# Patient Record
Sex: MALE | Race: WHITE | HISPANIC OR LATINO | Employment: FULL TIME | URBAN - METROPOLITAN AREA
[De-identification: names, ages, dates, MRNs, and addresses within clinical notes are randomized per-mention and may not be internally consistent; named-entity substitution may affect disease eponyms.]

---

## 2020-10-27 ENCOUNTER — OFFICE VISIT (OUTPATIENT)
Dept: URGENT CARE | Facility: CLINIC | Age: 20
End: 2020-10-27
Payer: OTHER MISCELLANEOUS

## 2020-10-27 VITALS
SYSTOLIC BLOOD PRESSURE: 100 MMHG | BODY MASS INDEX: 30.65 KG/M2 | RESPIRATION RATE: 14 BRPM | OXYGEN SATURATION: 99 % | HEIGHT: 68 IN | DIASTOLIC BLOOD PRESSURE: 61 MMHG | TEMPERATURE: 97.4 F | WEIGHT: 202.2 LBS | HEART RATE: 74 BPM

## 2020-10-27 DIAGNOSIS — K29.00 ACUTE GASTRITIS, PRESENCE OF BLEEDING UNSPECIFIED, UNSPECIFIED GASTRITIS TYPE: ICD-10-CM

## 2020-10-27 PROCEDURE — 99204 OFFICE O/P NEW MOD 45 MIN: CPT | Performed by: PHYSICIAN ASSISTANT

## 2020-10-27 RX ORDER — SUCRALFATE 1 G/1
1 TABLET ORAL
Qty: 120 TAB | Refills: 0 | Status: SHIPPED | OUTPATIENT
Start: 2020-10-27

## 2020-10-27 SDOH — HEALTH STABILITY: MENTAL HEALTH: HOW OFTEN DO YOU HAVE 6 OR MORE DRINKS ON ONE OCCASION?: MONTHLY

## 2020-10-27 ASSESSMENT — ENCOUNTER SYMPTOMS
NAUSEA: 0
FLANK PAIN: 0
FEVER: 0
CHILLS: 0
CONSTIPATION: 0
VOMITING: 0
HEARTBURN: 0
BLOOD IN STOOL: 0
ABDOMINAL PAIN: 1
ROS GI COMMENTS: BLOATING
DIARRHEA: 1

## 2020-10-27 NOTE — PROGRESS NOTES
Subjective:   Nolan Richter is a 20 y.o. male who presents today with   Chief Complaint   Patient presents with   • Abdominal Pain     x 1 week more to the L side. Denies blood in stool or urine but positive for diarrhea x 2 days.  He is worried about an ulcer.       Abdominal Pain  This is a new problem. The current episode started in the past 7 days. The onset quality is gradual. The problem occurs constantly. The problem has been unchanged. The pain is located in the generalized abdominal region. The pain is mild. The quality of the pain is sharp and cramping. Associated symptoms include diarrhea. Pertinent negatives include no constipation, dysuria, fever, frequency, hematuria, melena, nausea or vomiting. Nothing aggravates the pain. The pain is relieved by nothing. He has tried nothing for the symptoms. The treatment provided no relief.     States he does drink coffee Monday through Friday.  He states that he had similar issue for about a week 2 months ago.  He states that nothing makes the symptoms better.  He states that he does have some stress at work but nothing more than usual.  Diarrhea is occurring about 5-10 times a day.  PMH:  has no past medical history on file.  MEDS:   Current Outpatient Medications:   •  sucralfate (CARAFATE) 1 GM Tab, Take 1 Tab by mouth 4 Times a Day,Before Meals and at Bedtime., Disp: 120 Tab, Rfl: 0  ALLERGIES:   Allergies   Allergen Reactions   • Azithromycin Nausea     SURGHX: No past surgical history on file.  SOCHX:  reports that he has never smoked. He has never used smokeless tobacco. He reports current alcohol use of about 1.8 oz of alcohol per week. He reports previous drug use.  FH: Reviewed with patient, not pertinent to this visit.       Review of Systems   Constitutional: Negative for chills and fever.   Gastrointestinal: Positive for abdominal pain and diarrhea. Negative for blood in stool, constipation, heartburn, melena, nausea and vomiting.        Bloating  "  Genitourinary: Negative for dysuria, flank pain, frequency, hematuria and urgency.   All other systems reviewed and are negative.       Objective:   /61   Pulse 74   Temp 36.3 °C (97.4 °F) (Temporal)   Resp 14   Ht 1.727 m (5' 8\")   Wt 91.7 kg (202 lb 3.2 oz)   SpO2 99%   BMI 30.74 kg/m²   Physical Exam  Vitals signs and nursing note reviewed.   Constitutional:       General: He is not in acute distress.     Appearance: Normal appearance. He is well-developed. He is not ill-appearing or toxic-appearing.   HENT:      Head: Normocephalic and atraumatic.      Right Ear: Hearing normal.      Left Ear: Hearing normal.   Eyes:      Pupils: Pupils are equal, round, and reactive to light.   Cardiovascular:      Rate and Rhythm: Normal rate and regular rhythm.      Heart sounds: Normal heart sounds.   Pulmonary:      Effort: Pulmonary effort is normal.   Abdominal:      General: Abdomen is flat. Bowel sounds are increased. There is no distension.      Palpations: Abdomen is soft. There is no shifting dullness.      Tenderness: There is generalized abdominal tenderness. There is guarding. There is no right CVA tenderness, left CVA tenderness or rebound. Negative signs include Flores's sign and McBurney's sign.      Hernia: No hernia is present.   Musculoskeletal:      Comments: Normal movement in all 4 extremities   Skin:     General: Skin is warm and dry.   Neurological:      Mental Status: He is alert.      Coordination: Coordination normal.   Psychiatric:         Mood and Affect: Mood normal.       GI Cocktail did help resolve his symptoms today slightly.    Assessment/Plan:   Assessment    1. Acute gastritis, presence of bleeding unspecified, unspecified gastritis type  - REFERRAL TO GASTROENTEROLOGY  - sucralfate (CARAFATE) 1 GM Tab; Take 1 Tab by mouth 4 Times a Day,Before Meals and at Bedtime.  Dispense: 120 Tab; Refill: 0  Encouraged patient to cut back on acidic drinks and spicy foods.  Patient will " follow up with GI doctor. [place] diet.  Likely gastritis versus ulcer given patient's symptoms and relief of symptoms with GI cocktail.  Will trial Carafate today for patient and encouraged him to follow-up with GI doctor as he has had this recurrence in the past couple of months.  Differential diagnosis, natural history, supportive care, and indications for immediate follow-up discussed.   Patient given instructions and understanding of medications and treatment.    If not improving in 3-5 days, F/U with PCP or return to UC if symptoms worsen.  Strict ER precautions given with any worsening of symptoms or presence of blood in the stool.  Patient agreeable to plan.        Please note that this dictation was created using voice recognition software. I have made every reasonable attempt to correct obvious errors, but I expect that there are errors of grammar and possibly content that I did not discover before finalizing the note.    Luc Naidu PA-C